# Patient Record
(demographics unavailable — no encounter records)

---

## 2025-01-21 NOTE — ASSESSMENT
[FreeTextEntry1] : 88 yo F PMH of GUILLERMO, CHF, Vulvar cancer, DM, HTN, DM, CAD, Bedboud requiring karena lift. Sent in from [facility] for reported sleep disturbance. Patient stating she doesnt have any problem sleeping, says she gets woken up at night and then can't sleep.    On exam, lungs are clear, no wheezing or rhonchi, in NAD. Poor dentition. A/o x 2   Care plans discussed - will order for sleep study to assess for presence of ELENITA.

## 2025-01-21 NOTE — PHYSICAL EXAM
[No Acute Distress] : no acute distress [Nasal congestion] : nasal congestion [Normal Appearance] : normal appearance [Normal Rate/Rhythm] : normal rate/rhythm [Normal S1, S2] : normal s1, s2 [No Murmurs] : no murmurs [No Resp Distress] : no resp distress [Clear to Auscultation Bilaterally] : clear to auscultation bilaterally [No Abnormalities] : no abnormalities [Benign] : benign [No Cyanosis] : no cyanosis [No Edema] : no edema [No Focal Deficits] : no focal deficits [Oriented x3] : oriented x3 [Normal Affect] : normal affect

## 2025-01-21 NOTE — HISTORY OF PRESENT ILLNESS
[TextBox_4] : 86 yo F sent in from [facility] of GUILLERMO, CHF, Vulvar cancer, DM, HTN, DM, CAD, Bedboud requiring karena lift